# Patient Record
Sex: FEMALE | Race: WHITE | NOT HISPANIC OR LATINO | Employment: STUDENT | URBAN - METROPOLITAN AREA
[De-identification: names, ages, dates, MRNs, and addresses within clinical notes are randomized per-mention and may not be internally consistent; named-entity substitution may affect disease eponyms.]

---

## 2017-05-12 ENCOUNTER — OFFICE VISIT (OUTPATIENT)
Dept: FAMILY MEDICINE CLINIC | Facility: CLINIC | Age: 22
End: 2017-05-12

## 2017-05-12 ENCOUNTER — RESULTS ENCOUNTER (OUTPATIENT)
Dept: FAMILY MEDICINE CLINIC | Facility: CLINIC | Age: 22
End: 2017-05-12

## 2017-05-12 VITALS
BODY MASS INDEX: 19.16 KG/M2 | SYSTOLIC BLOOD PRESSURE: 118 MMHG | TEMPERATURE: 98.5 F | OXYGEN SATURATION: 98 % | HEIGHT: 65 IN | HEART RATE: 88 BPM | WEIGHT: 115 LBS | DIASTOLIC BLOOD PRESSURE: 70 MMHG

## 2017-05-12 DIAGNOSIS — R30.0 BURNING WITH URINATION: ICD-10-CM

## 2017-05-12 DIAGNOSIS — R30.0 BURNING WITH URINATION: Primary | ICD-10-CM

## 2017-05-12 LAB
BILIRUB BLD-MCNC: NEGATIVE MG/DL
CLARITY, POC: CLEAR
COLOR UR: ABNORMAL
GLUCOSE UR STRIP-MCNC: NEGATIVE MG/DL
KETONES UR QL: NEGATIVE
LEUKOCYTE EST, POC: NEGATIVE
NITRITE UR-MCNC: NEGATIVE MG/ML
PH UR: 5 [PH] (ref 5–8)
PROT UR STRIP-MCNC: NEGATIVE MG/DL
RBC # UR STRIP: ABNORMAL /UL
SP GR UR: 1.01 (ref 1–1.03)
UROBILINOGEN UR QL: NORMAL

## 2017-05-12 PROCEDURE — 81003 URINALYSIS AUTO W/O SCOPE: CPT | Performed by: FAMILY MEDICINE

## 2017-05-12 PROCEDURE — 99213 OFFICE O/P EST LOW 20 MIN: CPT | Performed by: FAMILY MEDICINE

## 2017-05-15 LAB
APPEARANCE UR: CLEAR
BACTERIA UR CULT: ABNORMAL
BACTERIA UR CULT: ABNORMAL
BILIRUB UR QL STRIP: NEGATIVE
COLOR UR: YELLOW
GLUCOSE UR QL: NEGATIVE
HGB UR QL STRIP: NEGATIVE
KETONES UR QL STRIP: NEGATIVE
LEUKOCYTE ESTERASE UR QL STRIP: NEGATIVE
Lab: NORMAL
MICRO URNS: ABNORMAL
NITRITE UR QL STRIP: NEGATIVE
ONE SPECIMEN IDENTIFIER: NORMAL
OTHER ANTIBIOTIC SUSC ISLT: ABNORMAL
PH UR STRIP: 6.5 [PH] (ref 5–7.5)
PROT UR QL STRIP: NEGATIVE
SP GR UR: <=1.005 (ref 1–1.03)
UROBILINOGEN UR STRIP-MCNC: 0.2 MG/DL (ref 0.2–1)

## 2017-05-15 RX ORDER — SULFAMETHOXAZOLE AND TRIMETHOPRIM 800; 160 MG/1; MG/1
1 TABLET ORAL 2 TIMES DAILY
Qty: 10 TABLET | Refills: 0 | Status: SHIPPED | OUTPATIENT
Start: 2017-05-15 | End: 2017-05-23

## 2017-05-17 DIAGNOSIS — R30.0 BURNING WITH URINATION: ICD-10-CM

## 2017-05-23 ENCOUNTER — OFFICE VISIT (OUTPATIENT)
Dept: FAMILY MEDICINE CLINIC | Facility: CLINIC | Age: 22
End: 2017-05-23

## 2017-05-23 VITALS
WEIGHT: 114 LBS | HEART RATE: 89 BPM | BODY MASS INDEX: 18.99 KG/M2 | OXYGEN SATURATION: 99 % | SYSTOLIC BLOOD PRESSURE: 116 MMHG | DIASTOLIC BLOOD PRESSURE: 70 MMHG | TEMPERATURE: 98.4 F | HEIGHT: 65 IN

## 2017-05-23 DIAGNOSIS — R35.0 URINARY FREQUENCY: Primary | ICD-10-CM

## 2017-05-23 LAB
BILIRUB BLD-MCNC: NEGATIVE MG/DL
CLARITY, POC: CLEAR
COLOR UR: YELLOW
GLUCOSE UR STRIP-MCNC: NEGATIVE MG/DL
KETONES UR QL: NEGATIVE
LEUKOCYTE EST, POC: NEGATIVE
NITRITE UR-MCNC: NEGATIVE MG/ML
PH UR: 5 [PH] (ref 5–8)
PROT UR STRIP-MCNC: NEGATIVE MG/DL
RBC # UR STRIP: NEGATIVE /UL
SP GR UR: 1.01 (ref 1–1.03)
UROBILINOGEN UR QL: NORMAL

## 2017-05-23 PROCEDURE — 81003 URINALYSIS AUTO W/O SCOPE: CPT | Performed by: FAMILY MEDICINE

## 2017-05-23 PROCEDURE — 99212 OFFICE O/P EST SF 10 MIN: CPT | Performed by: FAMILY MEDICINE

## 2017-06-26 ENCOUNTER — OFFICE VISIT (OUTPATIENT)
Dept: FAMILY MEDICINE CLINIC | Facility: CLINIC | Age: 22
End: 2017-06-26

## 2017-06-26 VITALS
WEIGHT: 114 LBS | OXYGEN SATURATION: 98 % | HEART RATE: 72 BPM | SYSTOLIC BLOOD PRESSURE: 118 MMHG | TEMPERATURE: 98.2 F | HEIGHT: 65 IN | DIASTOLIC BLOOD PRESSURE: 70 MMHG | BODY MASS INDEX: 18.99 KG/M2

## 2017-06-26 DIAGNOSIS — R31.9 BLOOD IN URINE: Primary | ICD-10-CM

## 2017-06-26 LAB
BILIRUB BLD-MCNC: NEGATIVE MG/DL
CLARITY, POC: CLEAR
COLOR UR: NORMAL
GLUCOSE UR STRIP-MCNC: NEGATIVE MG/DL
KETONES UR QL: NEGATIVE
LEUKOCYTE EST, POC: NEGATIVE
NITRITE UR-MCNC: NEGATIVE MG/ML
PH UR: 7 [PH] (ref 5–8)
PROT UR STRIP-MCNC: NEGATIVE MG/DL
RBC # UR STRIP: NEGATIVE /UL
SP GR UR: 1.01 (ref 1–1.03)
UROBILINOGEN UR QL: NORMAL

## 2017-06-26 PROCEDURE — 99213 OFFICE O/P EST LOW 20 MIN: CPT | Performed by: FAMILY MEDICINE

## 2017-06-26 PROCEDURE — 81003 URINALYSIS AUTO W/O SCOPE: CPT | Performed by: FAMILY MEDICINE

## 2017-06-26 NOTE — PROGRESS NOTES
Subjective   Yulissa Rae is a 21 y.o. female.   Chief Complaint   Patient presents with   • Blood in Urine       History of Present Illness     #1 Blood in urine-patient saw blood on the tissue that was coming from the urethra about 2 weeks ago.  She did not see blood in the urine, but she went to urgent care and she had her urine tested.  She was told there was a lot of blood in the urine. Urine culture was negative.  She did not have any symptoms of dysuria or frequency.  She was not on her period at that time.  She had similar episode a month earlier with blood on the tissue 1 or 2 days prior to the period.  She has no history of kidney stones.  There is no family history of kidney stones.    The following portions of the patient's history were reviewed and updated as appropriate: allergies, current medications, past medical history, past social history and problem list.    Review of Systems   Constitutional: Negative.    Genitourinary: Negative for dysuria, frequency, hematuria and urgency.         Objective   Wt Readings from Last 3 Encounters:   06/26/17 114 lb (51.7 kg)   05/23/17 114 lb (51.7 kg)   05/12/17 115 lb (52.2 kg)      Vitals:    06/26/17 1202   BP: 118/70   Pulse: 72   Temp: 98.2 °F (36.8 °C)   SpO2: 98%     Temp Readings from Last 3 Encounters:   06/26/17 98.2 °F (36.8 °C)   05/23/17 98.4 °F (36.9 °C)   05/12/17 98.5 °F (36.9 °C)     BP Readings from Last 3 Encounters:   06/26/17 118/70   05/23/17 116/70   05/12/17 118/70     Pulse Readings from Last 3 Encounters:   06/26/17 72   05/23/17 89   05/12/17 88       Physical Exam   Constitutional: She is oriented to person, place, and time. She appears well-developed and well-nourished.   HENT:   Head: Normocephalic and atraumatic.   Neck: Neck supple. Carotid bruit is not present.   Cardiovascular: Normal rate, regular rhythm and normal heart sounds.    Pulmonary/Chest: Effort normal and breath sounds normal.   Abdominal: Soft. She exhibits no  distension and no mass. There is no tenderness.   Neurological: She is alert and oriented to person, place, and time.   Skin: Skin is warm, dry and intact.   Psychiatric: She has a normal mood and affect. Her behavior is normal.       Assessment/Plan   Yulissa was seen today for blood in urine.    Diagnoses and all orders for this visit:    Blood in urine  -     POCT urinalysis dipstick, automated  -     Ambulatory Referral to Urology        #1 hematuria-urine dip negative for blood today.  We are requesting lab reports from urgent care.  As this is the second episode of hematuria I'm referring patient to urologist.

## 2017-10-27 ENCOUNTER — OFFICE VISIT (OUTPATIENT)
Dept: INTERNAL MEDICINE | Facility: CLINIC | Age: 22
End: 2017-10-27

## 2017-10-27 VITALS
HEART RATE: 102 BPM | HEIGHT: 65 IN | WEIGHT: 117 LBS | DIASTOLIC BLOOD PRESSURE: 60 MMHG | SYSTOLIC BLOOD PRESSURE: 100 MMHG | OXYGEN SATURATION: 97 % | TEMPERATURE: 98.2 F | BODY MASS INDEX: 19.49 KG/M2

## 2017-10-27 DIAGNOSIS — N90.89 CYST OF PERINEUM OF FEMALE: Primary | ICD-10-CM

## 2017-10-27 PROCEDURE — 99213 OFFICE O/P EST LOW 20 MIN: CPT | Performed by: FAMILY MEDICINE

## 2017-10-27 NOTE — PROGRESS NOTES
Subjective   Yulissa Rae is a 22 y.o. female.   Chief Complaint   Patient presents with   • vaginal lesion       History of Present Illness     #1 nodule- Patient was wiping this morning and she noticed a lump between vagina and rectum.  Not painful.  No other symptoms associated.  No vaginal discharge.  Nothing makes it better or worse.  LMP -started today.     The following portions of the patient's history were reviewed and updated as appropriate: allergies, current medications, past medical history, past social history and problem list.    Review of Systems   Constitutional: Negative.  Negative for fever.   Genitourinary: Negative for pelvic pain, vaginal discharge and vaginal pain.         Objective   Wt Readings from Last 3 Encounters:   10/27/17 117 lb (53.1 kg)   06/26/17 114 lb (51.7 kg)   05/23/17 114 lb (51.7 kg)      Vitals:    10/27/17 1253   BP: 100/60   Pulse: 102   Temp: 98.2 °F (36.8 °C)   SpO2: 97%     Temp Readings from Last 3 Encounters:   10/27/17 98.2 °F (36.8 °C)   06/26/17 98.2 °F (36.8 °C)   05/23/17 98.4 °F (36.9 °C)     BP Readings from Last 3 Encounters:   10/27/17 100/60   06/26/17 118/70   05/23/17 116/70     Pulse Readings from Last 3 Encounters:   10/27/17 102   06/26/17 72   05/23/17 89       Physical Exam   Constitutional: She appears well-developed and well-nourished.   Genitourinary:         Skin: Skin is warm and dry.   Psychiatric: She has a normal mood and affect. Her behavior is normal.       Assessment/Plan   Yulissa was seen today for vaginal lesion.    Diagnoses and all orders for this visit:    Cyst of perineum of female        #1 small cyst in perineum- warm compresses/sitz bath.  Return to clinic if symptoms are not resolved with treatment, or if worsening.

## 2018-06-26 ENCOUNTER — OFFICE VISIT (OUTPATIENT)
Dept: INTERNAL MEDICINE | Facility: CLINIC | Age: 23
End: 2018-06-26

## 2018-06-26 VITALS
WEIGHT: 122 LBS | TEMPERATURE: 98 F | OXYGEN SATURATION: 98 % | HEIGHT: 65 IN | SYSTOLIC BLOOD PRESSURE: 110 MMHG | DIASTOLIC BLOOD PRESSURE: 70 MMHG | HEART RATE: 75 BPM | BODY MASS INDEX: 20.33 KG/M2

## 2018-06-26 DIAGNOSIS — J02.9 SORE THROAT: Primary | ICD-10-CM

## 2018-06-26 LAB
EXPIRATION DATE: NORMAL
INTERNAL CONTROL: NORMAL
Lab: NORMAL
S PYO AG THROAT QL: NEGATIVE

## 2018-06-26 PROCEDURE — 99213 OFFICE O/P EST LOW 20 MIN: CPT | Performed by: FAMILY MEDICINE

## 2018-06-26 PROCEDURE — 87880 STREP A ASSAY W/OPTIC: CPT | Performed by: FAMILY MEDICINE

## 2018-06-26 NOTE — PROGRESS NOTES
Subjective   Yulissa Rae is a 22 y.o. female.   Chief Complaint   Patient presents with   • Sore Throat       History of Present Illness     #1 Dry cough for 3 days, yesterday became mildly productive, but patient is not sure what is the color of phlegm.  Sore throat for 1 day.  Subjective low-grade fever.  Sinus pressure for one day.  Small amount of clear nasal drainage. Patient used antihistamine yesterday and didn't help.  She used Flonase yesterday and it didn't help.  Tylenol helped some and Mucinex D helped some.  No other symptoms associated.    The following portions of the patient's history were reviewed and updated as appropriate: allergies, current medications, past medical history, past social history and problem list.    Review of Systems   Constitutional: Positive for fever.   HENT: Positive for congestion, postnasal drip and sore throat.    Respiratory: Positive for cough.          Objective   Wt Readings from Last 3 Encounters:   06/26/18 55.3 kg (122 lb)   10/27/17 53.1 kg (117 lb)   06/26/17 51.7 kg (114 lb)      Vitals:    06/26/18 1249   BP: 110/70   Pulse: 75   Temp: 98 °F (36.7 °C)   SpO2: 98%     Temp Readings from Last 3 Encounters:   06/26/18 98 °F (36.7 °C)   10/27/17 98.2 °F (36.8 °C)   06/26/17 98.2 °F (36.8 °C)     BP Readings from Last 3 Encounters:   06/26/18 110/70   10/27/17 100/60   06/26/17 118/70     Pulse Readings from Last 3 Encounters:   06/26/18 75   10/27/17 102   06/26/17 72       Physical Exam   Constitutional: She is oriented to person, place, and time. She appears well-developed and well-nourished.   HENT:   Head: Normocephalic and atraumatic.   Right Ear: Tympanic membrane, external ear and ear canal normal.   Left Ear: Tympanic membrane, external ear and ear canal normal.   Nose: Nose normal.   Mouth/Throat: Posterior oropharyngeal erythema present.   Neck: Neck supple.   Cardiovascular: Normal rate, regular rhythm and normal heart sounds.    Pulmonary/Chest: Effort  normal and breath sounds normal.   Lymphadenopathy:     She has no cervical adenopathy.   Neurological: She is alert and oriented to person, place, and time.   Skin: Skin is warm, dry and intact.       Assessment/Plan   Yulissa was seen today for sore throat.    Diagnoses and all orders for this visit:    Sore throat  -     POCT rapid strep A        #1 URI- Strep test is negative.  Symptomatically treatment.  Tylenol, saltwater gargling.

## 2018-07-03 ENCOUNTER — OFFICE VISIT (OUTPATIENT)
Dept: INTERNAL MEDICINE | Facility: CLINIC | Age: 23
End: 2018-07-03

## 2018-07-03 VITALS
WEIGHT: 121.6 LBS | DIASTOLIC BLOOD PRESSURE: 58 MMHG | HEIGHT: 65 IN | TEMPERATURE: 98.4 F | BODY MASS INDEX: 20.26 KG/M2 | SYSTOLIC BLOOD PRESSURE: 102 MMHG | HEART RATE: 119 BPM | OXYGEN SATURATION: 96 %

## 2018-07-03 DIAGNOSIS — J06.9 UPPER RESPIRATORY TRACT INFECTION, UNSPECIFIED TYPE: Primary | ICD-10-CM

## 2018-07-03 PROCEDURE — 99213 OFFICE O/P EST LOW 20 MIN: CPT | Performed by: NURSE PRACTITIONER

## 2018-07-03 RX ORDER — AZITHROMYCIN 250 MG/1
TABLET, FILM COATED ORAL
Qty: 6 TABLET | Refills: 0 | Status: SHIPPED | OUTPATIENT
Start: 2018-07-03 | End: 2018-12-03

## 2018-07-03 RX ORDER — ALBUTEROL SULFATE 90 UG/1
AEROSOL, METERED RESPIRATORY (INHALATION)
COMMUNITY
End: 2018-12-03

## 2018-07-03 NOTE — PROGRESS NOTES
Subjective   Yulissa Rae is a 22 y.o. female.     History of Present Illness   The patient is here today with complaints of nasal congestion, dry cough, wheezing, left ear draining and painful, has a tube. She is taking NSAIDs, sudafed and mucinex D. Help some. Low grade fever mon, tues, weds. Started 6/27th.     Sports induced asthma. She has used her rescue inhlaer 3-4 times the last week. Symptoms worse at night.     Recently seen for sore throat 6/26th with neg strep test.   The following portions of the patient's history were reviewed and updated as appropriate: allergies, current medications, past family history, past medical history, past social history, past surgical history and problem list.    Review of Systems   Constitutional: Positive for fever. Negative for chills.   HENT: Positive for ear pain and sinus pressure.    Respiratory: Positive for wheezing. Negative for shortness of breath.    Cardiovascular: Negative.    Psychiatric/Behavioral: Negative.        Objective   Physical Exam   Constitutional: She appears well-developed and well-nourished.   HENT:   Right Ear: Hearing, tympanic membrane, external ear and ear canal normal.   Left Ear: Hearing, external ear and ear canal normal.   Nose: Nose normal. Right sinus exhibits no maxillary sinus tenderness and no frontal sinus tenderness. Left sinus exhibits no maxillary sinus tenderness and no frontal sinus tenderness.   Mouth/Throat: Uvula is midline and oropharynx is clear and moist.   Left TM clear, tube present   Neck: Normal range of motion. Neck supple. No thyromegaly present.   Cardiovascular: Regular rhythm, normal heart sounds and intact distal pulses.  Tachycardia present.    Pulmonary/Chest: Effort normal and breath sounds normal.   Skin: Skin is warm and dry.   Psychiatric: She has a normal mood and affect. Her behavior is normal. Judgment and thought content normal.       Assessment/Plan   There are no diagnoses linked to this  encounter.    1. URI- ok to continue sudafed and mucinex, do not take together, also will give z-pack. Suggested antihistamine. If needed could add singulair    See ENT back if ear pain continues

## 2018-12-03 ENCOUNTER — OFFICE VISIT (OUTPATIENT)
Dept: INTERNAL MEDICINE | Facility: CLINIC | Age: 23
End: 2018-12-03

## 2018-12-03 VITALS
HEART RATE: 78 BPM | DIASTOLIC BLOOD PRESSURE: 70 MMHG | HEIGHT: 65 IN | BODY MASS INDEX: 20.33 KG/M2 | OXYGEN SATURATION: 98 % | TEMPERATURE: 98.2 F | WEIGHT: 122 LBS | SYSTOLIC BLOOD PRESSURE: 112 MMHG

## 2018-12-03 DIAGNOSIS — R53.83 OTHER FATIGUE: Primary | ICD-10-CM

## 2018-12-03 DIAGNOSIS — R45.89 DEPRESSED MOOD: ICD-10-CM

## 2018-12-03 PROBLEM — F41.9 ANXIETY: Status: ACTIVE | Noted: 2018-12-03

## 2018-12-03 PROCEDURE — 99213 OFFICE O/P EST LOW 20 MIN: CPT | Performed by: FAMILY MEDICINE

## 2018-12-03 NOTE — PROGRESS NOTES
Subjective   Yulissa Rae is a 23 y.o. female.   Chief Complaint   Patient presents with   • Fatigue   • Depression       History of Present Illness     Patient is here today because she would like to be tested for thyroid disease.  Her mother has Hashimoto's disease and patient experiences decreased mood and fatigue over last few months.    #1 depressed mood-it is on and off since 2016.  It is getting more frequent.  Over last few weeks patient has decreased mood and she feels hopeless.  it does not affect her daily activities.  She goes to school she takes care of things.  She has no suicidal ideations.  Her symptoms usually last for a few days at the time and then she is able to overcome it.  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  Last visit was in September 2018.  She started seeing counselor for anxiety.  PHQ is at 7, RANDY 7 at 5.    # 2 fatigue-started a few months ago.  She thinks that it can be related to busy school schedule, but is not sure about it.  She sleeps 6-7 hours at night and wakes up tired.  She does not snore.  Periods are mild.  No blood in stool.    The following portions of the patient's history were reviewed and updated as appropriate: allergies, current medications, past medical history, past social history and problem list.    Review of Systems   Constitutional: Positive for fatigue.   Respiratory: Negative.    Cardiovascular: Negative.    Psychiatric/Behavioral: Negative for suicidal ideas. The patient is nervous/anxious.          Objective   Wt Readings from Last 3 Encounters:   12/03/18 55.3 kg (122 lb)   07/03/18 55.2 kg (121 lb 9.6 oz)   06/26/18 55.3 kg (122 lb)      Vitals:    12/03/18 0844   BP: 112/70   Pulse: 78   Temp: 98.2 °F (36.8 °C)   SpO2: 98%     Temp Readings from Last 3 Encounters:   12/03/18 98.2 °F (36.8 °C)   07/03/18 98.4 °F (36.9 °C)   06/26/18 98 °F (36.7 °C)     BP Readings from Last 3 Encounters:   12/03/18 112/70   07/03/18 102/58    06/26/18 110/70     Pulse Readings from Last 3 Encounters:   12/03/18 78   07/03/18 119   06/26/18 75       Physical Exam   Constitutional: She is oriented to person, place, and time. She appears well-developed and well-nourished.   HENT:   Head: Normocephalic and atraumatic.   Neck: Neck supple. Carotid bruit is not present. No thyromegaly present.   Cardiovascular: Normal rate, regular rhythm and normal heart sounds.   Pulmonary/Chest: Effort normal and breath sounds normal.   Neurological: She is alert and oriented to person, place, and time.   Skin: Skin is warm, dry and intact.   Psychiatric: She has a normal mood and affect. Her behavior is normal.       Assessment/Plan   Yulissa was seen today for fatigue and depression.    Diagnoses and all orders for this visit:    Other fatigue  -     CBC (No Diff)  -     Thyroid Cascade Profile    Depressed mood  -     CBC (No Diff)  -     Thyroid Cascade Profile        #1 fatigue-we are checking labs.      #2 depression-we are checking thyroid test.  I'm advising patient to schedule follow-up with her counselor.

## 2018-12-04 LAB
ERYTHROCYTE [DISTWIDTH] IN BLOOD BY AUTOMATED COUNT: 13.4 % (ref 11.7–13)
HCT VFR BLD AUTO: 42.5 % (ref 35.6–45.5)
HGB BLD-MCNC: 14.4 G/DL (ref 11.9–15.5)
MCH RBC QN AUTO: 31.1 PG (ref 26.9–32)
MCHC RBC AUTO-ENTMCNC: 33.9 G/DL (ref 32.4–36.3)
MCV RBC AUTO: 91.8 FL (ref 80.5–98.2)
PLATELET # BLD AUTO: 286 10*3/MM3 (ref 140–500)
RBC # BLD AUTO: 4.63 10*6/MM3 (ref 3.9–5.2)
TSH SERPL DL<=0.005 MIU/L-ACNC: 2.03 UIU/ML (ref 0.45–4.5)
WBC # BLD AUTO: 7.95 10*3/MM3 (ref 4.5–10.7)